# Patient Record
Sex: MALE | Race: WHITE | NOT HISPANIC OR LATINO | Employment: PART TIME | URBAN - METROPOLITAN AREA
[De-identification: names, ages, dates, MRNs, and addresses within clinical notes are randomized per-mention and may not be internally consistent; named-entity substitution may affect disease eponyms.]

---

## 2018-04-05 ENCOUNTER — OFFICE VISIT (OUTPATIENT)
Dept: RETAIL CLINIC | Facility: CLINIC | Age: 23
End: 2018-04-05

## 2018-04-05 VITALS
SYSTOLIC BLOOD PRESSURE: 104 MMHG | OXYGEN SATURATION: 100 % | HEART RATE: 83 BPM | TEMPERATURE: 98.1 F | DIASTOLIC BLOOD PRESSURE: 68 MMHG | RESPIRATION RATE: 18 BRPM

## 2018-04-05 DIAGNOSIS — K52.9 GASTROENTERITIS, ACUTE: Primary | ICD-10-CM

## 2018-04-05 PROCEDURE — 99213 OFFICE O/P EST LOW 20 MIN: CPT | Performed by: NURSE PRACTITIONER

## 2018-04-05 RX ORDER — ONDANSETRON HYDROCHLORIDE 8 MG/1
8 TABLET, FILM COATED ORAL EVERY 8 HOURS PRN
Qty: 20 TABLET | Refills: 0 | Status: SHIPPED | OUTPATIENT
Start: 2018-04-05

## 2018-04-05 NOTE — PATIENT INSTRUCTIONS
Viral Gastroenteritis, Adult  Viral gastroenteritis is also known as the stomach flu. This condition is caused by certain germs (viruses). These germs can be passed from person to person very easily (are very contagious). This condition can cause sudden watery poop (diarrhea), fever, and throwing up (vomiting).  Having watery poop and throwing up can make you feel weak and cause you to get dehydrated. Dehydration can make you tired and thirsty, make you have a dry mouth, and make it so you pee (urinate) less often. Older adults and people with other diseases or a weak defense system (immune system) are at higher risk for dehydration. It is important to replace the fluids that you lose from having watery poop and throwing up.  Follow these instructions at home:  Follow instructions from your doctor about how to care for yourself at home.  Eating and drinking     Follow these instructions as told by your doctor:  · Take an oral rehydration solution (ORS). This is a drink that is sold at pharmacies and stores.  · Drink clear fluids in small amounts as you are able, such as:  ¨ Water.  ¨ Ice chips.  ¨ Diluted fruit juice.  ¨ Low-calorie sports drinks.  · Eat bland, easy-to-digest foods in small amounts as you are able, such as:  ¨ Bananas.  ¨ Applesauce.  ¨ Rice.  ¨ Low-fat (lean) meats.  ¨ Toast.  ¨ Crackers.  · Avoid fluids that have a lot of sugar or caffeine in them.  · Avoid alcohol.  · Avoid spicy or fatty foods.  General instructions   · Drink enough fluid to keep your pee (urine) clear or pale yellow.  · Wash your hands often. If you cannot use soap and water, use hand .  · Make sure that all people in your home wash their hands well and often.  · Rest at home while you get better.  · Take over-the-counter and prescription medicines only as told by your doctor.  · Watch your condition for any changes.  · Take a warm bath to help with any burning or pain from having watery poop.  · Keep all follow-up  visits as told by your doctor. This is important.  Contact a doctor if:  · You cannot keep fluids down.  · Your symptoms get worse.  · You have new symptoms.  · You feel light-headed or dizzy.  · You have muscle cramps.  Get help right away if:  · You have chest pain.  · You feel very weak or you pass out (faint).  · You see blood in your throw-up.  · Your throw-up looks like coffee grounds.  · You have bloody or black poop (stools) or poop that look like tar.  · You have a very bad headache, a stiff neck, or both.  · You have a rash.  · You have very bad pain, cramping, or bloating in your belly (abdomen).  · You have trouble breathing.  · You are breathing very quickly.  · Your heart is beating very quickly.  · Your skin feels cold and clammy.  · You feel confused.  · You have pain when you pee.  · You have signs of dehydration, such as:  ¨ Dark pee, hardly any pee, or no pee.  ¨ Cracked lips.  ¨ Dry mouth.  ¨ Sunken eyes.  ¨ Sleepiness.  ¨ Weakness.  This information is not intended to replace advice given to you by your health care provider. Make sure you discuss any questions you have with your health care provider.  Document Released: 06/05/2009 Document Revised: 07/07/2017 Document Reviewed: 08/23/2016  [employer] Patient Education © 2017 [employer]